# Patient Record
Sex: FEMALE | Race: WHITE | NOT HISPANIC OR LATINO | Employment: UNEMPLOYED | URBAN - NONMETROPOLITAN AREA
[De-identification: names, ages, dates, MRNs, and addresses within clinical notes are randomized per-mention and may not be internally consistent; named-entity substitution may affect disease eponyms.]

---
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
## 2024-03-13 ENCOUNTER — HOSPITAL ENCOUNTER (EMERGENCY)
Facility: HOSPITAL | Age: 19
Discharge: HOME | End: 2024-03-13
Attending: STUDENT IN AN ORGANIZED HEALTH CARE EDUCATION/TRAINING PROGRAM | Admitting: STUDENT IN AN ORGANIZED HEALTH CARE EDUCATION/TRAINING PROGRAM
Payer: COMMERCIAL

## 2024-03-13 VITALS
HEIGHT: 64 IN | DIASTOLIC BLOOD PRESSURE: 47 MMHG | TEMPERATURE: 98 F | RESPIRATION RATE: 16 BRPM | OXYGEN SATURATION: 96 % | HEART RATE: 95 BPM | WEIGHT: 196 LBS | SYSTOLIC BLOOD PRESSURE: 119 MMHG | BODY MASS INDEX: 33.46 KG/M2

## 2024-03-13 DIAGNOSIS — N39.0 UTI (URINARY TRACT INFECTION), UNCOMPLICATED: Primary | ICD-10-CM

## 2024-03-13 LAB
BACTERIA UR QL AUTO: ABNORMAL /HPF
BILIRUB UR QL STRIP: NEGATIVE
CLARITY UR: ABNORMAL
COLOR UR: ABNORMAL
GLUCOSE UR STRIP-MCNC: NEGATIVE MG/DL
HGB UR QL STRIP.AUTO: ABNORMAL
HYALINE CASTS UR QL AUTO: ABNORMAL /LPF
KETONES UR QL STRIP: NEGATIVE
LEUKOCYTE ESTERASE UR QL STRIP.AUTO: ABNORMAL
NITRITE UR QL STRIP: NEGATIVE
PH UR STRIP.AUTO: 7 [PH] (ref 5–8)
PROT UR QL STRIP: ABNORMAL
RBC # UR STRIP: ABNORMAL /HPF
REF LAB TEST METHOD: ABNORMAL
SP GR UR STRIP: 1.02 (ref 1–1.03)
SQUAMOUS #/AREA URNS HPF: ABNORMAL /HPF
UROBILINOGEN UR QL STRIP: ABNORMAL
WBC # UR STRIP: ABNORMAL /HPF

## 2024-03-13 PROCEDURE — 81001 URINALYSIS AUTO W/SCOPE: CPT | Performed by: PHYSICIAN ASSISTANT

## 2024-03-13 PROCEDURE — 87086 URINE CULTURE/COLONY COUNT: CPT | Performed by: PHYSICIAN ASSISTANT

## 2024-03-13 PROCEDURE — 87186 SC STD MICRODIL/AGAR DIL: CPT | Performed by: PHYSICIAN ASSISTANT

## 2024-03-13 PROCEDURE — 96372 THER/PROPH/DIAG INJ SC/IM: CPT

## 2024-03-13 PROCEDURE — 87077 CULTURE AEROBIC IDENTIFY: CPT | Performed by: PHYSICIAN ASSISTANT

## 2024-03-13 PROCEDURE — 25010000002 CEFTRIAXONE PER 250 MG: Performed by: PHYSICIAN ASSISTANT

## 2024-03-13 PROCEDURE — 99283 EMERGENCY DEPT VISIT LOW MDM: CPT

## 2024-03-13 RX ORDER — CEFDINIR 300 MG/1
300 CAPSULE ORAL 2 TIMES DAILY
Qty: 14 CAPSULE | Refills: 0 | Status: SHIPPED | OUTPATIENT
Start: 2024-03-13

## 2024-03-13 RX ADMIN — LIDOCAINE HYDROCHLORIDE 1 G: 10 INJECTION, SOLUTION EPIDURAL; INFILTRATION; INTRACAUDAL; PERINEURAL at 23:15

## 2024-03-14 NOTE — ED PROVIDER NOTES
Subjective   History of Present Illness  18-year-old female with no known past medical history presents to the emergency room with dysuria and urinary frequency which she states began this day.  States she constantly feels like she needs to use the bathroom and states when she goes she is just dribbling.  She denies any flank pain or history of nephrolithiasis.  Denies any fevers, chills, or bodyaches.  Denies any chance of pregnancy as states her last menstrual cycle was March 4, 2024.  Denies any specific aggravating or alleviating factors.  Denies any other complaints or concerns at this time.    History provided by:  Patient   used: No        Review of Systems   Constitutional: Negative.  Negative for fever.   HENT: Negative.     Respiratory: Negative.     Cardiovascular: Negative.  Negative for chest pain.   Gastrointestinal: Negative.  Negative for abdominal pain.   Endocrine: Negative.    Genitourinary:  Positive for dysuria and frequency.   Skin: Negative.    Neurological: Negative.    Psychiatric/Behavioral: Negative.     All other systems reviewed and are negative.      No past medical history on file.    No Known Allergies    No past surgical history on file.    No family history on file.    Social History     Socioeconomic History    Marital status: Single           Objective   Physical Exam  Vitals and nursing note reviewed.   Constitutional:       General: She is not in acute distress.     Appearance: She is well-developed. She is not diaphoretic.   HENT:      Head: Normocephalic and atraumatic.      Right Ear: External ear normal.      Left Ear: External ear normal.      Nose: Nose normal.   Eyes:      Conjunctiva/sclera: Conjunctivae normal.      Pupils: Pupils are equal, round, and reactive to light.   Neck:      Vascular: No JVD.      Trachea: No tracheal deviation.   Cardiovascular:      Rate and Rhythm: Normal rate and regular rhythm.      Heart sounds: Normal heart sounds. No  murmur heard.  Pulmonary:      Effort: Pulmonary effort is normal. No respiratory distress.      Breath sounds: Normal breath sounds. No wheezing.   Abdominal:      General: Bowel sounds are normal.      Palpations: Abdomen is soft.      Tenderness: There is no abdominal tenderness.   Musculoskeletal:         General: No deformity. Normal range of motion.      Cervical back: Normal range of motion and neck supple.   Skin:     General: Skin is warm and dry.      Coloration: Skin is not pale.      Findings: No erythema or rash.   Neurological:      Mental Status: She is alert and oriented to person, place, and time.      Cranial Nerves: No cranial nerve deficit.   Psychiatric:         Behavior: Behavior normal.         Thought Content: Thought content normal.         Procedures           ED Course  ED Course as of 03/13/24 2303   Wed Mar 13, 2024   2255 Leukocytes, UA(!): Moderate (2+) [TK]      ED Course User Index  [TK] Elmer Schrader PA-C                                   Results for orders placed or performed during the hospital encounter of 03/13/24   Urinalysis With Culture If Indicated - Urine, Clean Catch    Specimen: Urine, Clean Catch   Result Value Ref Range    Color, UA Red (A) Yellow, Straw    Appearance, UA Cloudy (A) Clear    pH, UA 7.0 5.0 - 8.0    Specific Gravity, UA 1.021 1.005 - 1.030    Glucose, UA Negative Negative    Ketones, UA Negative Negative    Bilirubin, UA Negative Negative    Blood, UA Large (3+) (A) Negative    Protein, UA >=300 mg/dL (3+) (A) Negative    Leuk Esterase, UA Moderate (2+) (A) Negative    Nitrite, UA Negative Negative    Urobilinogen, UA 1.0 E.U./dL 0.2 - 1.0 E.U./dL   Urinalysis, Microscopic Only - Urine, Clean Catch    Specimen: Urine, Clean Catch   Result Value Ref Range    RBC, UA Too Numerous to Count (A) None Seen, 0-2 /HPF    WBC, UA Unable to determine due to loaded field (A) None Seen, 0-2 /HPF    Bacteria, UA Unable to determine due to loaded field (A) None  Seen /HPF    Squamous Epithelial Cells, UA Unable to determine due to loaded field (A) None Seen, 0-2 /HPF    Hyaline Casts, UA Unable to determine due to loaded field None Seen /LPF    Methodology Manual Light Microscopy                Medical Decision Making  18-year-old female with no known past medical history presents to the emergency room with dysuria and urinary frequency which she states began this day.  States she constantly feels like she needs to use the bathroom and states when she goes she is just dribbling.  She denies any flank pain or history of nephrolithiasis.  Denies any fevers, chills, or bodyaches.  Denies any chance of pregnancy as states her last menstrual cycle was March 4, 2024.  Denies any specific aggravating or alleviating factors.  Denies any other complaints or concerns at this time.      Problems Addressed:  UTI (urinary tract infection), uncomplicated: complicated acute illness or injury    Amount and/or Complexity of Data Reviewed  Labs: ordered. Decision-making details documented in ED Course.    Risk  Prescription drug management.        Final diagnoses:   UTI (urinary tract infection), uncomplicated       ED Disposition  ED Disposition       ED Disposition   Discharge    Condition   Stable    Comment   --               Herminio Sommers MD  [address] [facility] [place] [address] [phone]    In 2 days      Bridger Mosley MD  [address] [facility]    [place] [id number]  [phone]    In 2 days           Medication List        New Prescriptions      cefdinir 300 MG capsule  Commonly known as: OMNICEF  Take 1 capsule by mouth 2 (Two) Times a Day.               Where to Get Your Medications        These medications were sent to [id number] - [place] - [address] [facility] [address] - [phone] [place] - [phone] [place]  [address] [place] [address]      Phone: [phone]   cefdinir 300 MG capsule             Elmer Schrader PA-C  03/13/24 9981

## 2024-03-15 LAB — BACTERIA SPEC AEROBE CULT: ABNORMAL
